# Patient Record
Sex: FEMALE | Race: WHITE | Employment: PART TIME | URBAN - METROPOLITAN AREA
[De-identification: names, ages, dates, MRNs, and addresses within clinical notes are randomized per-mention and may not be internally consistent; named-entity substitution may affect disease eponyms.]

---

## 2021-01-28 ENCOUNTER — HOSPITAL ENCOUNTER (EMERGENCY)
Age: 41
Discharge: HOME | End: 2021-01-28
Attending: EMERGENCY MEDICINE

## 2021-01-28 VITALS
HEART RATE: 105 BPM | HEIGHT: 62 IN | OXYGEN SATURATION: 100 % | TEMPERATURE: 98.3 F | WEIGHT: 170 LBS | BODY MASS INDEX: 31.28 KG/M2 | RESPIRATION RATE: 18 BRPM | DIASTOLIC BLOOD PRESSURE: 94 MMHG | SYSTOLIC BLOOD PRESSURE: 160 MMHG

## 2021-01-28 DIAGNOSIS — N75.0 BARTHOLIN GLAND CYST: ICD-10-CM

## 2021-01-28 DIAGNOSIS — N76.0 VAGINITIS AND VULVOVAGINITIS: Primary | ICD-10-CM

## 2021-01-28 PROCEDURE — 56420 I&D BARTHOLINS GLAND ABSCESS: CPT

## 2021-01-28 PROCEDURE — 99283 EMERGENCY DEPT VISIT LOW MDM: CPT

## 2021-01-28 PROCEDURE — 2500000003 HC RX 250 WO HCPCS: Performed by: EMERGENCY MEDICINE

## 2021-01-28 RX ORDER — 0.9 % SODIUM CHLORIDE 0.9 %
1000 INTRAVENOUS SOLUTION INTRAVENOUS ONCE
Status: DISCONTINUED | OUTPATIENT
Start: 2021-01-28 | End: 2021-01-28

## 2021-01-28 RX ORDER — LIDOCAINE HYDROCHLORIDE AND EPINEPHRINE BITARTRATE 20; .01 MG/ML; MG/ML
10 INJECTION, SOLUTION SUBCUTANEOUS ONCE
Status: COMPLETED | OUTPATIENT
Start: 2021-01-28 | End: 2021-01-28

## 2021-01-28 RX ORDER — CLINDAMYCIN HYDROCHLORIDE 300 MG/1
300 CAPSULE ORAL 4 TIMES DAILY
Qty: 40 CAPSULE | Refills: 0 | Status: SHIPPED | OUTPATIENT
Start: 2021-01-28 | End: 2021-02-07

## 2021-01-28 RX ORDER — HYDROCODONE BITARTRATE AND ACETAMINOPHEN 5; 325 MG/1; MG/1
1 TABLET ORAL EVERY 6 HOURS PRN
Qty: 20 TABLET | Refills: 0 | Status: SHIPPED | OUTPATIENT
Start: 2021-01-28 | End: 2021-02-02

## 2021-01-28 RX ADMIN — LIDOCAINE HYDROCHLORIDE,EPINEPHRINE BITARTRATE 10 ML: 20; .01 INJECTION, SOLUTION INFILTRATION; PERINEURAL at 18:04

## 2021-01-28 ASSESSMENT — PAIN DESCRIPTION - DESCRIPTORS: DESCRIPTORS: PRESSURE

## 2021-01-28 ASSESSMENT — PAIN DESCRIPTION - PAIN TYPE: TYPE: ACUTE PAIN

## 2021-01-28 ASSESSMENT — PAIN DESCRIPTION - LOCATION: LOCATION: VAGINA

## 2021-01-28 NOTE — ED TRIAGE NOTES
Patient in with a painful cyst in her vulva area. Rates pain at a 10. She states this is reoccurring.

## 2021-01-28 NOTE — ED PROVIDER NOTES
HPI:  1/28/21, Time: 5:10 PM DENISE Gill is a 45 y.o. female presenting to the ED for vaginal pain, beginning 1 week ago. The complaint has been constant, moderate in severity, and worsened by changing position. Patient has a history of Bartholin gland cyst she believes she is getting 1 on the left side. There is been pain swelling and tenderness no vaginal bleeding no vaginal discharge no hematuria no dysuria    ROS:   Pertinent positives and negatives are stated within HPI, all other systems reviewed and are negative.  --------------------------------------------- PAST HISTORY ---------------------------------------------  Past Medical History:  has no past medical history on file. Past Surgical History:  has no past surgical history on file. Social History:  reports that she has been smoking. She has never used smokeless tobacco. She reports current alcohol use. She reports current drug use. Drug: Marijuana. Family History: family history is not on file. The patients home medications have been reviewed. Allergies: Patient has no known allergies. ---------------------------------------------------PHYSICAL EXAM--------------------------------------     Constitutional/General: Alert and oriented x3, well appearing, non toxic in NAD  Head: Normocephalic and atraumatic  Eyes: PERRL, EOMI  Mouth: Oropharynx clear, handling secretions, no trismus  Neck: Supple, full ROM, non tender to palpation in the midline, no stridor, no crepitus, no meningeal signs  Pulmonary: Lungs clear to auscultation bilaterally, no wheezes, rales, or rhonchi. Not in respiratory distress  Cardiovascular:  Regular rate. Regular rhythm. No murmurs, gallops, or rubs. 2+ distal pulses  Chest: no chest wall tenderness  Abdomen: Soft. Non tender. Non distended. +BS. No rebound, guarding, or rigidity. No pulsatile masses appreciated. Musculoskeletal: Moves all extremities x 4.  Warm and well perfused, no clubbing, complications and alternatives and any questions were answered. Consent was obtained. .  Prep: The skin was cleansed with povidone iodine and draped in a sterile fashion. Anesthetic: The wound area was anesthetized with Lidocaine 2% with epinephrine. Incision: Soft tissue abscess of Labia was Incised by scalpal and copious fluid was drained. A wound culture was not obtained. The wound  was irrigated and was packed with iodoform gauze. The wound was then covered with a sterile dressing. Patient tolerated the procedure well. She was discharged home on clindamycin and Norco with outpatient follow-up with her gynecologist in 48 hours        This patient's ED course included: a personal history and physicial examination, re-evaluation prior to disposition and a personal history and physicial eaxmination    This patient has remained hemodynamically stable and improved during their ED course. Counseling: The emergency provider has spoken with the patient and discussed todays results, in addition to providing specific details for the plan of care and counseling regarding the diagnosis and prognosis. Questions are answered at this time and they are agreeable with the plan.       --------------------------------- IMPRESSION AND DISPOSITION ---------------------------------    IMPRESSION  1. Vaginitis and vulvovaginitis    2. Bartholin gland cyst        DISPOSITION  Disposition: Discharge to home  Patient condition is good        NOTE: This report was transcribed using voice recognition software.  Every effort was made to ensure accuracy; however, inadvertent computerized transcription errors may be present          Hanny Edmond MD  01/28/21 0955

## 2021-07-08 ENCOUNTER — HOSPITAL ENCOUNTER (OUTPATIENT)
Age: 41
Setting detail: OBSERVATION
Discharge: ANOTHER HOSPITAL | End: 2021-07-09
Attending: EMERGENCY MEDICINE
Payer: COMMERCIAL

## 2021-07-08 DIAGNOSIS — N75.1 BARTHOLIN'S GLAND ABSCESS: Primary | ICD-10-CM

## 2021-07-08 PROBLEM — N75.0 BARTHOLIN CYST: Status: ACTIVE | Noted: 2021-07-08

## 2021-07-08 LAB
ALBUMIN SERPL-MCNC: 4.1 G/DL (ref 3.5–4.6)
ALP BLD-CCNC: 79 U/L (ref 40–130)
ALT SERPL-CCNC: 8 U/L (ref 0–33)
ANION GAP SERPL CALCULATED.3IONS-SCNC: 12 MEQ/L (ref 9–15)
AST SERPL-CCNC: 8 U/L (ref 0–35)
BACTERIA: ABNORMAL /HPF
BASOPHILS ABSOLUTE: 0 K/UL (ref 0–0.1)
BASOPHILS RELATIVE PERCENT: 0.2 % (ref 0.1–1.2)
BILIRUB SERPL-MCNC: 0.4 MG/DL (ref 0.2–0.7)
BILIRUBIN URINE: NEGATIVE
BLOOD, URINE: NEGATIVE
BUN BLDV-MCNC: 6 MG/DL (ref 6–20)
CALCIUM SERPL-MCNC: 9.4 MG/DL (ref 8.5–9.9)
CHLORIDE BLD-SCNC: 95 MEQ/L (ref 95–107)
CLARITY: NORMAL
CO2: 27 MEQ/L (ref 20–31)
COLOR: YELLOW
CREAT SERPL-MCNC: 0.67 MG/DL (ref 0.5–0.9)
EOSINOPHILS ABSOLUTE: 0 K/UL (ref 0–0.4)
EOSINOPHILS RELATIVE PERCENT: 0.1 % (ref 0.7–5.8)
EPITHELIAL CELLS, UA: ABNORMAL /HPF
GFR AFRICAN AMERICAN: >60
GFR NON-AFRICAN AMERICAN: >60
GLOBULIN: 3.9 G/DL (ref 2.3–3.5)
GLUCOSE BLD-MCNC: 154 MG/DL (ref 70–99)
GLUCOSE URINE: NEGATIVE MG/DL
HCG(URINE) PREGNANCY TEST: NEGATIVE
HCT VFR BLD CALC: 37.7 % (ref 37–47)
HEMOGLOBIN: 12.7 G/DL (ref 11.2–15.7)
IMMATURE GRANULOCYTES #: 0.1 K/UL
IMMATURE GRANULOCYTES %: 0.5 %
KETONES, URINE: NEGATIVE MG/DL
LEUKOCYTE ESTERASE, URINE: NORMAL
LYMPHOCYTES ABSOLUTE: 1.1 K/UL (ref 1.2–3.7)
LYMPHOCYTES RELATIVE PERCENT: 7.3 %
MCH RBC QN AUTO: 32.6 PG (ref 25.6–32.2)
MCHC RBC AUTO-ENTMCNC: 33.7 % (ref 32.2–35.5)
MCV RBC AUTO: 96.7 FL (ref 79.4–94.8)
MONOCYTES ABSOLUTE: 0.8 K/UL (ref 0.2–0.9)
MONOCYTES RELATIVE PERCENT: 5.5 % (ref 4.7–12.5)
NEUTROPHILS ABSOLUTE: 12.9 K/UL (ref 1.6–6.1)
NEUTROPHILS RELATIVE PERCENT: 86.4 % (ref 34–71.1)
NITRITE, URINE: NEGATIVE
PDW BLD-RTO: 12.8 % (ref 11.7–14.4)
PH UA: 6.5 (ref 5–9)
PLATELET # BLD: 262 K/UL (ref 182–369)
POTASSIUM SERPL-SCNC: 3.9 MEQ/L (ref 3.4–4.9)
PROTEIN UA: NEGATIVE MG/DL
RBC # BLD: 3.9 M/UL (ref 3.93–5.22)
RBC UA: ABNORMAL /HPF (ref 0–2)
SODIUM BLD-SCNC: 134 MEQ/L (ref 135–144)
SPECIFIC GRAVITY UA: 1.01 (ref 1–1.03)
TOTAL PROTEIN: 8 G/DL (ref 6.3–8)
URINE REFLEX TO CULTURE: NORMAL
UROBILINOGEN, URINE: 0.2 E.U./DL
WBC # BLD: 15 K/UL (ref 4–10)
WBC UA: ABNORMAL /HPF (ref 0–5)

## 2021-07-08 PROCEDURE — G0378 HOSPITAL OBSERVATION PER HR: HCPCS

## 2021-07-08 PROCEDURE — 81001 URINALYSIS AUTO W/SCOPE: CPT

## 2021-07-08 PROCEDURE — 87491 CHLMYD TRACH DNA AMP PROBE: CPT

## 2021-07-08 PROCEDURE — 2580000003 HC RX 258: Performed by: EMERGENCY MEDICINE

## 2021-07-08 PROCEDURE — 85025 COMPLETE CBC W/AUTO DIFF WBC: CPT

## 2021-07-08 PROCEDURE — 80053 COMPREHEN METABOLIC PANEL: CPT

## 2021-07-08 PROCEDURE — 6360000002 HC RX W HCPCS: Performed by: EMERGENCY MEDICINE

## 2021-07-08 PROCEDURE — 96375 TX/PRO/DX INJ NEW DRUG ADDON: CPT

## 2021-07-08 PROCEDURE — 36415 COLL VENOUS BLD VENIPUNCTURE: CPT

## 2021-07-08 PROCEDURE — 84703 CHORIONIC GONADOTROPIN ASSAY: CPT

## 2021-07-08 PROCEDURE — 96365 THER/PROPH/DIAG IV INF INIT: CPT

## 2021-07-08 PROCEDURE — 99284 EMERGENCY DEPT VISIT MOD MDM: CPT

## 2021-07-08 PROCEDURE — 96366 THER/PROPH/DIAG IV INF ADDON: CPT

## 2021-07-08 PROCEDURE — 87591 N.GONORRHOEAE DNA AMP PROB: CPT

## 2021-07-08 RX ORDER — KETOROLAC TROMETHAMINE 30 MG/ML
30 INJECTION, SOLUTION INTRAMUSCULAR; INTRAVENOUS ONCE
Status: COMPLETED | OUTPATIENT
Start: 2021-07-08 | End: 2021-07-08

## 2021-07-08 RX ORDER — MORPHINE SULFATE 4 MG/ML
4 INJECTION, SOLUTION INTRAMUSCULAR; INTRAVENOUS ONCE
Status: COMPLETED | OUTPATIENT
Start: 2021-07-08 | End: 2021-07-08

## 2021-07-08 RX ORDER — 0.9 % SODIUM CHLORIDE 0.9 %
1000 INTRAVENOUS SOLUTION INTRAVENOUS ONCE
Status: COMPLETED | OUTPATIENT
Start: 2021-07-08 | End: 2021-07-08

## 2021-07-08 RX ORDER — ALPRAZOLAM 1 MG/1
1 TABLET ORAL NIGHTLY PRN
COMMUNITY
End: 2021-07-27

## 2021-07-08 RX ORDER — SERTRALINE HYDROCHLORIDE 100 MG/1
200 TABLET, FILM COATED ORAL DAILY
COMMUNITY

## 2021-07-08 RX ORDER — SODIUM CHLORIDE 0.9 % (FLUSH) 0.9 %
3 SYRINGE (ML) INJECTION EVERY 8 HOURS
Status: DISCONTINUED | OUTPATIENT
Start: 2021-07-08 | End: 2021-07-09 | Stop reason: HOSPADM

## 2021-07-08 RX ORDER — ONDANSETRON 2 MG/ML
4 INJECTION INTRAMUSCULAR; INTRAVENOUS ONCE
Status: COMPLETED | OUTPATIENT
Start: 2021-07-08 | End: 2021-07-08

## 2021-07-08 RX ADMIN — ONDANSETRON 4 MG: 2 INJECTION INTRAMUSCULAR; INTRAVENOUS at 19:36

## 2021-07-08 RX ADMIN — SODIUM CHLORIDE 1000 ML: 9 INJECTION, SOLUTION INTRAVENOUS at 18:39

## 2021-07-08 RX ADMIN — MORPHINE SULFATE 4 MG: 4 INJECTION, SOLUTION INTRAMUSCULAR; INTRAVENOUS at 20:12

## 2021-07-08 RX ADMIN — KETOROLAC TROMETHAMINE 30 MG: 30 INJECTION, SOLUTION INTRAMUSCULAR at 19:36

## 2021-07-08 RX ADMIN — PIPERACILLIN AND TAZOBACTAM 3375 MG: 3; .375 INJECTION, POWDER, FOR SOLUTION INTRAVENOUS at 19:20

## 2021-07-08 RX ADMIN — Medication 3 ML: at 18:39

## 2021-07-08 ASSESSMENT — PAIN SCALES - GENERAL
PAINLEVEL_OUTOF10: 5
PAINLEVEL_OUTOF10: 8
PAINLEVEL_OUTOF10: 7
PAINLEVEL_OUTOF10: 5

## 2021-07-08 ASSESSMENT — PAIN DESCRIPTION - DESCRIPTORS: DESCRIPTORS: BURNING;SHARP;THROBBING

## 2021-07-08 ASSESSMENT — PAIN DESCRIPTION - PAIN TYPE: TYPE: ACUTE PAIN

## 2021-07-08 ASSESSMENT — PAIN DESCRIPTION - LOCATION: LOCATION: VAGINA

## 2021-07-08 NOTE — ED TRIAGE NOTES
Patient reports vaginal pain and swelling, states she has history of bartholin cyst. Started on keflex recently and has been taking as directed. A/0 x3, ambulatory, resps even and unlabored on room air.

## 2021-07-08 NOTE — ED NOTES
RN at bedside for exam with Dr. Radha Negron patient tolerated well.       Natalie Fonseca RN  07/08/21 2071

## 2021-07-08 NOTE — ED PROVIDER NOTES
CC/HPI: 52-year-old female to the emergency department chief complaint of left-sided vaginal swelling. Patient with a history of Bartholin gland cyst abscess. Patient states she has had to have them drained before. This started approximately 4 days ago. Gradually worsening. Patient started antibiotic Keflex approximately 2 to 3 days ago after having a virtual visit with a physician line. Patient states she felt fevered the first night. No vaginal discharge no irregular bleeding. Patient states it is swollen to the point that now she is having difficult time urinating. VITALS/PMH/PSH: Reviewed per nurses notes    REVIEW OF SYSTEMS: As in chief complaint history of present illness, otherwise all other systems are reviewed and negative the total 10 systems reviewed    PHYSICAL EXAM:   Exam done with female nurse present  GEN: Pt alert and oriented, appears uncomfortable  HEENT:         Normocephalic/Atramatic        PERRL, EOMI       Throat non-edematous. No erythema noted. No exudates noted. Moist membranes  NECK: Nontender, no signs of trauma, no lymphadenopathy  HEART: Reg S1/S2, without murmer, rub or gallop  LUNGS: Clear to auscultation bilaterally, respirations even and unlabored  ABDOMEN: Bowel sounds positive, soft, nondistended. Mild appearing suprapubic tenderness to palpation. : Examination of the genital area shows moderate to severe swelling with tenderness to palpation and erythema to the entire left side of the patient's vaginal area introitus labia minora and majora. There is no fluctuance noted no drainage noted appears tender to palpation. No vaginal discharge noted. There are questionable small ulceration/vesicle lesions that appear to be secondary to swelling rather than herpetic. Viral culture obtained. MUSCULOSKELETAL/EXTREMITITES:  No signs of trauma, cyanosis or edema.   No CVA tenderness  LYMPH: no peripheral lympadenopathy noted  SKIN:  Warm & dry, no rash  NEUROLOGIC: Alert and oriented. Speech clear    Medical decision making/ED course;  Patient remained stable throughout the course of emergency department stay. IV was established lab work was obtained. Patient was given an IV dose of Zosyn. Case was initially discussed with Dr. Lloyd Cary (OB/GYN) and recommendation of patient being admitted under medicine with GYN on consult. This was discussed with Dr. Quin Chu then Dr. Dwaine Parada, who requested the case be discussed with the oncoming gynecologist Dr. Lucy Kowalski. Clinical impression;  1) Bartholin gland abscess    Disposition/plan;   Patient transferred via EMS to [facility] for further evaluation and treatment.      Peter Laguna DO  07/08/21 1927

## 2021-07-09 ENCOUNTER — HOSPITAL ENCOUNTER (OUTPATIENT)
Age: 41
Setting detail: OBSERVATION
Discharge: HOME | End: 2021-07-09
Attending: INTERNAL MEDICINE | Admitting: INTERNAL MEDICINE
Payer: COMMERCIAL

## 2021-07-09 VITALS
DIASTOLIC BLOOD PRESSURE: 66 MMHG | OXYGEN SATURATION: 98 % | TEMPERATURE: 99.1 F | HEIGHT: 62 IN | WEIGHT: 150 LBS | RESPIRATION RATE: 17 BRPM | HEART RATE: 74 BPM | BODY MASS INDEX: 27.6 KG/M2 | SYSTOLIC BLOOD PRESSURE: 109 MMHG

## 2021-07-09 VITALS
HEIGHT: 62 IN | OXYGEN SATURATION: 98 % | RESPIRATION RATE: 18 BRPM | BODY MASS INDEX: 28.72 KG/M2 | HEART RATE: 72 BPM | WEIGHT: 156.09 LBS | DIASTOLIC BLOOD PRESSURE: 70 MMHG | TEMPERATURE: 98.2 F | SYSTOLIC BLOOD PRESSURE: 140 MMHG

## 2021-07-09 PROCEDURE — G0379 DIRECT REFER HOSPITAL OBSERV: HCPCS

## 2021-07-09 PROCEDURE — 6370000000 HC RX 637 (ALT 250 FOR IP): Performed by: NURSE PRACTITIONER

## 2021-07-09 PROCEDURE — 87070 CULTURE OTHR SPECIMN AEROBIC: CPT

## 2021-07-09 PROCEDURE — G0378 HOSPITAL OBSERVATION PER HR: HCPCS

## 2021-07-09 PROCEDURE — 87205 SMEAR GRAM STAIN: CPT

## 2021-07-09 PROCEDURE — 87075 CULTR BACTERIA EXCEPT BLOOD: CPT

## 2021-07-09 PROCEDURE — 96374 THER/PROPH/DIAG INJ IV PUSH: CPT

## 2021-07-09 PROCEDURE — 6360000002 HC RX W HCPCS: Performed by: INTERNAL MEDICINE

## 2021-07-09 PROCEDURE — 6370000000 HC RX 637 (ALT 250 FOR IP): Performed by: OBSTETRICS & GYNECOLOGY

## 2021-07-09 RX ORDER — TRAMADOL HYDROCHLORIDE 50 MG/1
50 TABLET ORAL EVERY 4 HOURS PRN
Status: DISCONTINUED | OUTPATIENT
Start: 2021-07-09 | End: 2021-07-09 | Stop reason: HOSPADM

## 2021-07-09 RX ORDER — SULFAMETHOXAZOLE AND TRIMETHOPRIM 800; 160 MG/1; MG/1
1 TABLET ORAL EVERY 12 HOURS SCHEDULED
Status: DISCONTINUED | OUTPATIENT
Start: 2021-07-09 | End: 2021-07-09 | Stop reason: HOSPADM

## 2021-07-09 RX ORDER — SERTRALINE HYDROCHLORIDE 100 MG/1
200 TABLET, FILM COATED ORAL DAILY
Status: DISCONTINUED | OUTPATIENT
Start: 2021-07-09 | End: 2021-07-09 | Stop reason: HOSPADM

## 2021-07-09 RX ORDER — SULFAMETHOXAZOLE AND TRIMETHOPRIM 800; 160 MG/1; MG/1
1 TABLET ORAL EVERY 12 HOURS SCHEDULED
Qty: 14 TABLET | Refills: 0 | Status: SHIPPED | OUTPATIENT
Start: 2021-07-09 | End: 2021-07-16

## 2021-07-09 RX ORDER — MELOXICAM 7.5 MG/1
15 TABLET ORAL DAILY
Qty: 20 TABLET | Refills: 0 | Status: SHIPPED | OUTPATIENT
Start: 2021-07-09 | End: 2021-07-19

## 2021-07-09 RX ORDER — KETOROLAC TROMETHAMINE 30 MG/ML
30 INJECTION, SOLUTION INTRAMUSCULAR; INTRAVENOUS EVERY 6 HOURS PRN
Status: DISCONTINUED | OUTPATIENT
Start: 2021-07-09 | End: 2021-07-09

## 2021-07-09 RX ORDER — IBUPROFEN 600 MG/1
600 TABLET ORAL EVERY 6 HOURS PRN
Status: DISCONTINUED | OUTPATIENT
Start: 2021-07-09 | End: 2021-07-09 | Stop reason: HOSPADM

## 2021-07-09 RX ORDER — ACETAMINOPHEN 325 MG/1
650 TABLET ORAL EVERY 4 HOURS PRN
Status: DISCONTINUED | OUTPATIENT
Start: 2021-07-09 | End: 2021-07-09 | Stop reason: HOSPADM

## 2021-07-09 RX ADMIN — TRAMADOL HYDROCHLORIDE 50 MG: 50 TABLET, FILM COATED ORAL at 11:43

## 2021-07-09 RX ADMIN — TRAMADOL HYDROCHLORIDE 50 MG: 50 TABLET, FILM COATED ORAL at 17:47

## 2021-07-09 RX ADMIN — SULFAMETHOXAZOLE AND TRIMETHOPRIM 1 TABLET: 800; 160 TABLET ORAL at 11:43

## 2021-07-09 RX ADMIN — KETOROLAC TROMETHAMINE 30 MG: 30 INJECTION, SOLUTION INTRAMUSCULAR; INTRAVENOUS at 04:18

## 2021-07-09 RX ADMIN — SERTRALINE 200 MG: 100 TABLET, FILM COATED ORAL at 09:06

## 2021-07-09 RX ADMIN — IBUPROFEN 600 MG: 600 TABLET, FILM COATED ORAL at 11:42

## 2021-07-09 RX ADMIN — ACETAMINOPHEN 650 MG: 325 TABLET ORAL at 17:47

## 2021-07-09 ASSESSMENT — PAIN DESCRIPTION - PAIN TYPE: TYPE: ACUTE PAIN

## 2021-07-09 ASSESSMENT — ENCOUNTER SYMPTOMS
DIARRHEA: 0
NAUSEA: 0
CHEST TIGHTNESS: 0
CONSTIPATION: 0
ABDOMINAL PAIN: 0
WHEEZING: 0
SORE THROAT: 0
SHORTNESS OF BREATH: 0
VOMITING: 0
EYES NEGATIVE: 1
TROUBLE SWALLOWING: 0
COUGH: 0

## 2021-07-09 ASSESSMENT — PAIN DESCRIPTION - LOCATION: LOCATION: VAGINA

## 2021-07-09 ASSESSMENT — PAIN SCALES - GENERAL
PAINLEVEL_OUTOF10: 7
PAINLEVEL_OUTOF10: 7
PAINLEVEL_OUTOF10: 5
PAINLEVEL_OUTOF10: 3

## 2021-07-09 ASSESSMENT — PAIN DESCRIPTION - DESCRIPTORS: DESCRIPTORS: BURNING;SHARP;THROBBING

## 2021-07-09 NOTE — DISCHARGE SUMMARY
Hospital Medicine Discharge Summary    Siva Bell  :  1980  MRN:  [id number]    Admit date:  2021  Discharge date:  2021    Admitting Physician:  Natalia Ledbetter DO  Primary Care Physician:  Rochelle Morfin MD      Discharge Diagnoses:      Bartholin abscess       Hospital Course:     Patient is a 62-year-old female who presented to hospital with a Bartholin abscess. She was observed as she was not responding to oral antibiotic palpation. She was seen by gynecologist.  Abscess was spontaneously draining. No I&D was needed. Recommendation was made for 7 days of Bactrim by gynecologist.  This was sent to her pharmacy. She will follow-up as outpatient with gynecologist.    Exam on discharge:   BP (!) 140/70   Pulse 72   Temp 98.2 °F (36.8 °C) (Oral)   Resp 18   Ht 5' 2\" (1.575 m)   Wt 156 lb 1.4 oz (70.8 kg)   LMP 2021   SpO2 98%   BMI 28.55 kg/m²   General appearance: No apparent distress, appears stated age and cooperative. HEENT: Pupils equal, round, and reactive to light. Conjunctivae/corneas clear. Neck: Supple, with full range of motion. No jugular venous distention. Trachea midline. Respiratory:  Normal respiratory effort. Clear to auscultation, bilaterally without Rales/Wheezes/Rhonchi. Cardiovascular: Regular rate and rhythm with normal S1/S2 without murmurs, rubs or gallops. Abdomen: Soft, non-tender, non-distended with normal bowel sounds. Musculoskeletal: No clubbing, cyanosis or edema bilaterally. Full range of motion without deformity. Skin: Skin color, texture, turgor normal.  No rashes or lesions. Neuro: Non Focal. Symetrical motor and tone. Nl Comprehension, Alert,awake and oriented. NL CN. Symetrical tone and reflexes.   Psychiatric: Alert and oriented, thought content appropriate, normal insight  Capillary Refill: Brisk,< 3 seconds   Peripheral Pulses: +2 palpable, equal bilaterally     Patient was seen by the following consultants while admitted to [facility] [address]:   Consults:  IP CONSULT TO OB GYN  IP CONSULT TO OB GYN    Significant Diagnostic Studies:    Refer to chart     Please refer to chart if no studies are shown here    No results found. Discharge Medications:       Blas Fuentes   Home Medication Instructions CEI:[id number]    Printed on:07/09/21 6689   Medication Information                      ALPRAZolam (XANAX) 1 MG tablet  Take 1 mg by mouth nightly as needed for Sleep.             sertraline (ZOLOFT) 100 MG tablet  Take 200 mg by mouth daily             sulfamethoxazole-trimethoprim (BACTRIM DS;SEPTRA DS) 800-160 MG per tablet  Take 1 tablet by mouth every 12 hours for 7 days                 Disposition:   If discharged to Home, Any [place] [address] needs that were indicated and/or required as been addressed and set up by Social Work. Condition at discharge: good     Activity: activity as tolerated    Total time taken for discharging this patient: 40 minutes. Greater than 70% of time was spent focused exclusively on this patient. Time was taken to review chart, discuss plans with consultants, reconciling medications, discussing plan answering questions with patient.      Annika Umaña DO  7/9/2021, 4:50 PM  ----------------------------------------------------------------------------------------------------------------------    Michelle Benitez

## 2021-07-09 NOTE — ED NOTES
[facility] called for update on transfer, States it will be Argelia Culp couple more hours\". Oscar Lo supervisor made aware. Andrea Dailey.  Valerie [facility]  07/08/21 1585

## 2021-07-09 NOTE — ED NOTES
Transfer center called back with Dr. Raulito Leonard on line. Dr. Raulito Leonard accepted patient. Waiting on bed assignment.       Nico Mercado  07/08/21 2005       Bárbara Jarrett  07/08/21 2006

## 2021-07-09 NOTE — ED NOTES
Transfer Center attempted other transport, no sooner transport found. Thiago Serrano.  Rayna Durham  07/09/21 0004

## 2021-07-09 NOTE — CARE COORDINATION
MET WITH PATIENT, FREEDOM OF CHOICE OFFERED. PATIENT STATES PLAN IS TO RETURN HOME TODAY. PCP LIST GIVEN AND  TO SCHEDULE APPT. PATIENT USES [facility] IN [place] FOR PRESCRIPTIONS. DENIES FURTHER DC NEEDS AT THIS TIME.

## 2021-07-09 NOTE — ED NOTES
Called transfer center for bed update. States bed is assigned but is not clean. Will call back with info after bed is ready.       Serena Adams  07/08/21 4563

## 2021-07-09 NOTE — CONSULTS
Department of Obstetrics and Gynecology   Attending  History and Physical    07/09/21      37 y.o. No obstetric history on file. 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 At least 3 (including this one) since November 2020. Affecting both sides at one occasional or another. Reports having had multiple I&Ds over the past five years and at least twice had Word catheter placed. Does not have an Ob/Gyn currently. Presently, noticed painful vulvar mass about 5 days ago. Progressively worsened to the point yesterday her pain was severe and disabling, which took her to the ED. She had previously a few days prior to this, started Keflex she received from her outpatient provider. Yesterday when she presented to the ED, she felt subjectively febrile and had been having some nausea without vomiting. Notably, yesterday felt a \"pop\" and noted that the lesion started to spontaneously drain purulent fluid. Today reports her pain is \"a lot better\" and that the lesion has decreased in size from up to 8cm to now around 2-3cm. . Still draining some fluid. ROS: Currently negative for F/C, N/V, CP, SOB, palpitations, dizziness/lightheadedness, abd pain, GI or  complaints except as mentioned above     Past Medical History:   Diagnosis Date    Anxiety     Bartholin's cyst     Depression     OCD (obsessive compulsive disorder)        Past Surgical History:   Procedure Laterality Date    MOUTH SURGERY     EAB    OB History   No obstetric history on file. Menstrual History:  OB History    No obstetric history on file. Patient's last menstrual period was 07/01/2021. Prior to Admission medications    Medication Sig Start Date End Date Taking? Authorizing Provider   sertraline (ZOLOFT) 100 MG tablet Take 200 mg by mouth daily   Yes Historical Provider, MD   ALPRAZolam (XANAX) 1 MG tablet Take 1 mg by mouth nightly as needed for Sleep.    Yes Historical Provider, MD       No Known Allergies    Social History     Socioeconomic History    Marital status: Single     Spouse name: None    Number of children: None    Years of education: None    Highest education level: None   Occupational History    None   Tobacco Use    Smoking status: Current Every Day Smoker    Smokeless tobacco: Never Used   Vaping Use    Vaping Use: Never used   Substance and Sexual Activity    Alcohol use: Yes     Comment: social    Drug use: Yes     Types: Marijuana     Comment: vape pen    Sexual activity: Yes   Other Topics Concern    None   Social History Narrative    None     Social Determinants of Health     Financial Resource Strain:     Difficulty of Paying Living Expenses:    Food Insecurity:     Worried About Running Out of Food in the Last Year:     Ran Out of Food in the Last Year:    Transportation Needs:     Lack of Transportation (Medical):  Lack of Transportation (Non-Medical):    Physical Activity:     Days of Exercise per Week:     Minutes of Exercise per Session:    Stress:     Feeling of Stress :    Social Connections:     Frequency of Communication with Friends and Family:     Frequency of Social Gatherings with Friends and Family:     Attends Hindu Services:     Active Member of Clubs or Organizations:     Attends Club or Organization Meetings:     Marital Status:    Intimate Partner Violence:     Fear of Current or Ex-Partner:     Emotionally Abused:     Physically Abused:     Sexually Abused:        History reviewed. No pertinent family history. OBJECTIVE:      Vitals:    BP (!) 140/70   Pulse 72   Temp 98.2 °F (36.8 °C) (Oral)   Resp 18   Ht 5' 2\" (1.575 m)   Wt 156 lb 1.4 oz (70.8 kg)   LMP 07/01/2021   SpO2 98%   BMI 28.55 kg/m²   VS reviewed. Has been afebrile     A+O x 3.  No Distress except with palpation of the mass  Even, unlabored respirations  RRR No m/r/g  Soft, NT, ND   MSK 5/5 LE  Neuro nonfocal  Mood: good, affect full range         OB/Gyn Exam  3cm soft, tender bartholin abscess draining yellow fluid. Expressed scant amount with palpation. Left labium swollen, but all in all, the mass feels unloculated and solitary. Well defined without erythema or warmth around the lesion. ASSESSMENT AND PLAN:    37 y.o. f with recurrent bartholin abscess    Patient Active Problem List    Diagnosis Date Noted    OCD (obsessive compulsive disorder)     Bartholin cyst 07/08/2021      - Bartholin abscess:     - Because of its multiple recurrence, she is at a high risk of infection. Will order Bactrim and recommend 7 day course upon discharge    - Because it's spontaneously draining and she has significantly improved symptoms, I recommended observation with warm compresses throughout the day to see if more purulence can be drained on its own vs. I&D w/ or w/o Word catheter placement. She'd like to obs and we'll re-eval later in the evening.     - Counseled extensively on red flag sx that should prompt return and further management of the problem. Specifically discussed marsupialization procedure vs. Gland excision, she is to make an appt to establish Ob/Gyn care and for f/u within 2 weeks of going home. - Will collect culture from site if material can be obtained. Thank you for the consult Please feel free to call the OB/Gyn in house physician phone *[phone] with any questions.      Electronically signed by Aracelis Rivas MD on 7/9/2021 at 12:08 PM

## 2021-07-09 NOTE — H&P
[place] [address] MEDICINE    HISTORY AND PHYSICAL EXAM    PATIENT NAME:  Forrest Sams    MRN:  [id number]  SERVICE DATE:  7/9/2021   SERVICE TIME:  3:18 AM    Primary Care Physician: No primary care provider on file. SUBJECTIVE  CHIEF COMPLAINT:  Left side vaginal swelling. HPI:  Forrest Sams is a 37 y.o., , female who  has a past medical history of Anxiety, Bartholin's cyst, Depression, and OCD (obsessive compulsive disorder). that is hospitalized for bartholin cyst infection. HPI She presents w 4 day history of left side vaginal swelling from Bartholin's cysts x 2  One of which has spontaneously drained prior to admission here. She was taking  keflex - feels it \"didn't do anything to help\"   She complains of pain, had possible fever last evening. Denies N/V, abdominal and chest pain. She has history of the same. Required cysts to be drained in the past.      PAST MEDICAL HISTORY:    Past Medical History:   Diagnosis Date    Anxiety     Bartholin's cyst     Depression     OCD (obsessive compulsive disorder)      PAST SURGICAL HISTORY:    Past Surgical History:   Procedure Laterality Date    MOUTH SURGERY       FAMILY HISTORY:  History reviewed. No pertinent family history.   SOCIAL HISTORY:    Social History     Socioeconomic History    Marital status: Single     Spouse name: Not on file    Number of children: Not on file    Years of education: Not on file    Highest education level: Not on file   Occupational History    Not on file   Tobacco Use    Smoking status: Current Every Day Smoker    Smokeless tobacco: Never Used   Vaping Use    Vaping Use: Never used   Substance and Sexual Activity    Alcohol use: Yes     Comment: social    Drug use: Yes     Types: Marijuana     Comment: vape pen    Sexual activity: Yes   Other Topics Concern    Not on file   Social History Narrative    Not on file     Social Determinants of Health     Financial Resource Strain:     Difficulty of Paying Living Expenses:    Food Insecurity:     Worried About [facility] in the Last Year:     [address] in the Last Year:    Transportation Needs:     Lack of Transportation (Medical):  Lack of Transportation (Non-Medical):    Physical Activity:     Days of Exercise per Week:     Minutes of Exercise per Session:    Stress:     Feeling of Stress :    Social Connections:     Frequency of Communication with Friends and Family:     Frequency of Social Gatherings with Friends and Family:     Attends Worship Services:     Active Member of Clubs or Organizations:     Attends Club or Organization Meetings:     Marital Status:    Intimate Partner Violence:     Fear of Current or Ex-Partner:     Emotionally Abused:     Physically Abused:     Sexually Abused:      MEDICATIONS:   Prior to Admission medications    Medication Sig Start Date End Date Taking? Authorizing Provider   sertraline (ZOLOFT) 100 MG tablet Take 200 mg by mouth daily   Yes Historical Provider, MD   ALPRAZolam (XANAX) 1 MG tablet Take 1 mg by mouth nightly as needed for Sleep. Yes Historical Provider, MD       ALLERGIES: Patient has no known allergies. REVIEW OF SYSTEM:   Review of Systems   Constitutional: Negative for chills, diaphoresis, fatigue and fever. HENT: Negative for sore throat and trouble swallowing. Eyes: Negative. Respiratory: Negative for cough, chest tightness, shortness of breath and wheezing. Cardiovascular: Negative for chest pain and palpitations. Gastrointestinal: Negative for abdominal pain, constipation, diarrhea, nausea and vomiting. Endocrine: Negative. Genitourinary: Positive for vaginal pain. Negative for dysuria, flank pain, hematuria and urgency. Musculoskeletal: Negative. Skin: Negative. Neurological: Negative for dizziness, syncope, speech difficulty, weakness, numbness and headaches. Psychiatric/Behavioral: Negative.          OBJECTIVE  PHYSICAL EXAM: Chloride 95 95 - 107 mEq/L    CO2 27 20 - 31 mEq/L    Anion Gap 12 9 - 15 mEq/L    Glucose 154 (H) 70 - 99 mg/dL    BUN 6 6 - 20 mg/dL    CREATININE 0.67 0.50 - 0.90 mg/dL    GFR Non-African American >60.0 >60    GFR  >60.0 >60    Calcium 9.4 8.5 - 9.9 mg/dL    Total Protein 8.0 6.3 - 8.0 g/dL    Albumin 4.1 3.5 - 4.6 g/dL    Total Bilirubin 0.4 0.2 - 0.7 mg/dL    Alkaline Phosphatase 79 40 - 130 U/L    ALT 8 0 - 33 U/L    AST 8 0 - 35 U/L    Globulin 3.9 (H) 2.3 - 3.5 g/dL   CBC Auto Differential    Collection Time: 07/08/21  6:30 PM   Result Value Ref Range    WBC 15.0 (H) 4.0 - 10.0 K/uL    RBC 3.90 (L) 3.93 - 5.22 M/uL    Hemoglobin 12.7 11.2 - 15.7 g/dL    Hematocrit 37.7 37.0 - 47.0 %    MCV 96.7 (H) 79.4 - 94.8 fL    MCH 32.6 (H) 25.6 - 32.2 pg    MCHC 33.7 32.2 - 35.5 %    RDW 12.8 11.7 - 14.4 %    Platelets 199 079 - 699 K/uL    Neutrophils % 86.4 (H) 34.0 - 71.1 %    Immature Granulocytes % 0.5 %    Lymphocytes % 7.3 %    Monocytes % 5.5 4.7 - 12.5 %    Eosinophils % 0.1 (L) 0.7 - 5.8 %    Basophils % 0.2 0.1 - 1.2 %    Neutrophils Absolute 12.9 (H) 1.6 - 6.1 K/uL    Immature Granulocytes # 0.1 K/uL    Lymphocytes Absolute 1.1 (L) 1.2 - 3.7 K/uL    Monocytes Absolute 0.8 0.2 - 0.9 K/uL    Eosinophils Absolute 0.0 0.0 - 0.4 K/uL    Basophils Absolute 0.0 0.0 - 0.1 K/uL   Pregnancy, Urine    Collection Time: 07/08/21  7:23 PM   Result Value Ref Range    HCG(Urine) Pregnancy Test Negative Detects HCG level >20 MIU/mL   Urine Reflex to Culture    Collection Time: 07/08/21  7:23 PM    Specimen: Urine, clean catch   Result Value Ref Range    Color, UA Yellow Straw/Yellow    Clarity, UA Cloudy Clear    Glucose, Ur Negative Negative mg/dL    Bilirubin Urine Negative Negative    Ketones, Urine Negative Negative mg/dL    Specific Gravity, UA 1.010 1.005 - 1.030    Blood, Urine Negative Negative    pH, UA 6.5 5.0 - 9.0    Protein, UA Negative Negative mg/dL    Urobilinogen, Urine 0.2 <2.0 E.U./dL Nitrite, Urine Negative Negative    Leukocyte Esterase, Urine Trace Negative    Urine Reflex to Culture Not Indicated    Microscopic Urinalysis    Collection Time: 07/08/21  7:23 PM   Result Value Ref Range    WBC, UA 3-5 0 - 5 /HPF    RBC, UA None seen 0 - 2 /HPF    Epithelial Cells, UA 20-50 /HPF    Bacteria, UA RARE (A) Negative /HPF       IMAGING:  No results found. VTE Prophylaxis: low molecular weight heparin -  start    ASSESSMENT AND PLAN  Principal Problem:    Bartholin cyst left labia  Painful  One cyst drained spontaneously w some relief of pain. Received zosyn at OSH,  On keflex prior to that   Plan: admit  Warm compress to affected area. Consider GYN and ID consults. Active Problems:    OCD (obsessive compulsive disorder)  Sertraline  Xanax prn  She is cooperative, a good historian, appropriate. Plan: continue meds.           Plan of care discussed with: patient    SIGNATURE: YADIEL Hawley - CNP  DATE: July 9, 2021  TIME: 3:18 AM   Joce Cannon DO  - supervising physician

## 2021-07-12 LAB
ANAEROBIC CULTURE: NORMAL
C. TRACHOMATIS DNA ,URINE: NEGATIVE
GRAM STAIN RESULT: NORMAL
N. GONORRHOEAE DNA, URINE: NEGATIVE
WOUND/ABSCESS: NORMAL

## 2021-07-27 ENCOUNTER — OFFICE VISIT (OUTPATIENT)
Dept: OBGYN CLINIC | Age: 41
End: 2021-07-27

## 2021-07-27 VITALS — SYSTOLIC BLOOD PRESSURE: 122 MMHG | WEIGHT: 148 LBS | BODY MASS INDEX: 27.07 KG/M2 | DIASTOLIC BLOOD PRESSURE: 88 MMHG

## 2021-07-27 DIAGNOSIS — A63.0 CONDYLOMA: ICD-10-CM

## 2021-07-27 DIAGNOSIS — Z01.419 WOMEN'S ANNUAL ROUTINE GYNECOLOGICAL EXAMINATION: ICD-10-CM

## 2021-07-27 DIAGNOSIS — N64.52 DISCHARGE FROM LEFT NIPPLE: ICD-10-CM

## 2021-07-27 DIAGNOSIS — N76.4 VULVAR ABSCESS: ICD-10-CM

## 2021-07-27 DIAGNOSIS — Z11.51 ENCOUNTER FOR SCREENING FOR HUMAN PAPILLOMAVIRUS (HPV): ICD-10-CM

## 2021-07-27 DIAGNOSIS — S31.40XA OPEN WOUND OF VULVA, INITIAL ENCOUNTER: ICD-10-CM

## 2021-07-27 DIAGNOSIS — N63.11 BREAST LUMP ON RIGHT SIDE AT 11 O'CLOCK POSITION: ICD-10-CM

## 2021-07-27 DIAGNOSIS — N64.3 GALACTORRHEA: ICD-10-CM

## 2021-07-27 DIAGNOSIS — Z76.89 ENCOUNTER TO ESTABLISH CARE WITH NEW DOCTOR: Primary | ICD-10-CM

## 2021-07-27 DIAGNOSIS — N95.1 PERIMENOPAUSE: ICD-10-CM

## 2021-07-27 DIAGNOSIS — N94.3 PMS (PREMENSTRUAL SYNDROME): ICD-10-CM

## 2021-07-27 LAB
PROLACTIN: 17 NG/ML
TSH REFLEX: 0.89 UIU/ML (ref 0.44–3.86)

## 2021-07-27 PROCEDURE — G8419 CALC BMI OUT NRM PARAM NOF/U: HCPCS | Performed by: OBSTETRICS & GYNECOLOGY

## 2021-07-27 PROCEDURE — 99204 OFFICE O/P NEW MOD 45 MIN: CPT | Performed by: OBSTETRICS & GYNECOLOGY

## 2021-07-27 PROCEDURE — 4004F PT TOBACCO SCREEN RCVD TLK: CPT | Performed by: OBSTETRICS & GYNECOLOGY

## 2021-07-27 PROCEDURE — G8427 DOCREV CUR MEDS BY ELIG CLIN: HCPCS | Performed by: OBSTETRICS & GYNECOLOGY

## 2021-07-27 RX ORDER — PROGESTERONE 200 MG/1
200 CAPSULE ORAL NIGHTLY
Qty: 30 CAPSULE | Refills: 3 | Status: SHIPPED | OUTPATIENT
Start: 2021-07-27

## 2021-07-27 ASSESSMENT — ENCOUNTER SYMPTOMS
BLOOD IN STOOL: 0
ABDOMINAL DISTENTION: 0
SORE THROAT: 0
COLOR CHANGE: 0
CHEST TIGHTNESS: 0
SHORTNESS OF BREATH: 0
NAUSEA: 0
BACK PAIN: 0
ABDOMINAL PAIN: 0
TROUBLE SWALLOWING: 0
VOMITING: 0
VOICE CHANGE: 0
CONSTIPATION: 0
COUGH: 0
WHEEZING: 0

## 2021-07-27 NOTE — PROGRESS NOTES
HPI:  Livia Savage (: 1980) is a 37 y.o. female, New patient, here for evaluation of the following chief complaint(s):  Follow-up (er follow up)  Patient with multiple complaints. Initially referred for evaluation of a Bartholin gland cyst for which she was admitted earlier in the month. It spontaneously drained. This is been a recurrent problem. She has worsening PMS. She has depression and anxiety. Also complaining of galactorrhea. Denies headaches and vision changes. Nulligravida. Taking Zoloft and Klonopin      SUBJECTIVE/OBJECTIVE:    Past Surgical History:   Procedure Laterality Date    MOUTH SURGERY      OTHER SURGICAL HISTORY      : pregnancy termination x 2         Review of Systems   Constitutional: Negative for activity change, appetite change, fatigue and unexpected weight change. HENT: Negative for dental problem, ear pain, hearing loss, nosebleeds, sore throat, trouble swallowing and voice change. Eyes: Negative for visual disturbance. Respiratory: Negative for cough, chest tightness, shortness of breath and wheezing. Cardiovascular: Negative for chest pain and palpitations. Gastrointestinal: Negative for abdominal distention, abdominal pain, blood in stool, constipation, nausea and vomiting. Endocrine: Negative for cold intolerance, heat intolerance, polydipsia, polyphagia and polyuria. Genitourinary: Positive for menstrual problem. Negative for difficulty urinating, dyspareunia, dysuria, flank pain, frequency, genital sores, hematuria, pelvic pain, urgency, vaginal bleeding, vaginal discharge and vaginal pain. Musculoskeletal: Negative for arthralgias, back pain, joint swelling and myalgias. Skin: Negative for color change and rash. Allergic/Immunologic: Negative for environmental allergies, food allergies and immunocompromised state. Neurological: Negative for dizziness, seizures, syncope, speech difficulty, weakness, numbness and headaches.    Hematological: person, place, and time. Vitals:    07/27/21 0917   BP: 122/88   Weight: 148 lb (67.1 kg)         ASSESSMENT/PLAN:     Diagnosis Orders   1. Encounter to establish care with new doctor  Ambulatory referral to [facility]   2. Breast lump on right side at 11 o'clock position  [facility] W OR WO CAD BILATERAL   3. Discharge from left nipple  [facility] W OR WO CAD BILATERAL    Prolactin    TSH with Reflex   4. Encounter for screening for human papillomavirus (HPV)  PAP SMEAR   5. Women's annual routine gynecological examination  PAP SMEAR   6. Open wound of vulva, initial encounter  Culture, Wound   7. PMS (premenstrual syndrome)     8. Galactorrhea     9. Perimenopause     10. Vulvar abscess     11. Condyloma         PLAN: Mammogram and ultrasound attention to the right breast.  Pap smear is obtained. For the PMS she is going to take Prometrium 200 mg nightly day 16 through 30. And I advised her to follow-up when the Bartholin gland is full so that I can reevaluate this area. Patient to come in in 2 to 3 months for recheck of the PMS symptoms      No follow-ups on file. On this date 7/27/2021 I have spent 45 minutes reviewing previous notes, test results and face to face with the patient discussing the diagnosis and importance of compliance with the treatment plan as well as documenting on the day of the visit. An electronic signature was used to authenticate this note. Please note this report has been partially produced using speech recognition software and may cause contain errors related to that system including grammar, punctuation and spelling as well as words and phrases that may seem inappropriate. If there are questions or concerns please feel free to contact me to clarify.

## 2021-07-30 LAB
GRAM STAIN RESULT: NORMAL
WOUND/ABSCESS: NORMAL

## 2021-08-02 LAB
HPV COMMENT: NORMAL
HPV TYPE 16: NOT DETECTED
HPV TYPE 18: NOT DETECTED
HPVOH (OTHER TYPES): NOT DETECTED

## 2021-08-04 ENCOUNTER — TELEPHONE (OUTPATIENT)
Dept: OBGYN CLINIC | Age: 41
End: 2021-08-04

## 2021-08-04 DIAGNOSIS — N63.11 BREAST LUMP ON RIGHT SIDE AT 11 O'CLOCK POSITION: ICD-10-CM

## 2021-08-04 DIAGNOSIS — Z12.31 ENCOUNTER FOR SCREENING MAMMOGRAM FOR BREAST CANCER: Primary | ICD-10-CM

## 2021-08-04 NOTE — TELEPHONE ENCOUNTER
[facility] radiology calling to get updated mammogram order to include US if needed. Pt is scheduled next Wednesday.